# Patient Record
Sex: FEMALE | Race: WHITE | URBAN - NONMETROPOLITAN AREA
[De-identification: names, ages, dates, MRNs, and addresses within clinical notes are randomized per-mention and may not be internally consistent; named-entity substitution may affect disease eponyms.]

---

## 2022-06-08 ENCOUNTER — TELEPHONE (OUTPATIENT)
Dept: OBGYN CLINIC | Age: 42
End: 2022-06-08

## 2022-06-08 NOTE — TELEPHONE ENCOUNTER
Patient called stating that she has a visit tomorrow for a UTI, but questioning what she can use in the meantime. Her symptoms started last Wednesday and she was treating with herbal medication since then. She states that symptoms started to resolve, but was out of town for the weekend and symptoms have worsened. She is experiencing dysuria and frequency. She did have a low grade fever last night, but nothing this morning. She has also noticed some back pain, but thinks it may be related to the hotel bed. She is aware that she can try OTC Azo to see if that helps with the discomfort before her appointment tomorrow. She can use Tylenol/motrin if she notices a fever. If symptoms worsen, she is aware that we have openings this afternoon and can move her visit up. Patient verbalized understanding, no further questions/concerns voiced.

## 2022-06-09 ENCOUNTER — OFFICE VISIT (OUTPATIENT)
Dept: OBGYN CLINIC | Age: 42
End: 2022-06-09
Payer: COMMERCIAL

## 2022-06-09 ENCOUNTER — HOSPITAL ENCOUNTER (OUTPATIENT)
Age: 42
Setting detail: SPECIMEN
Discharge: HOME | End: 2022-06-09

## 2022-06-09 VITALS
WEIGHT: 187 LBS | DIASTOLIC BLOOD PRESSURE: 72 MMHG | BODY MASS INDEX: 30.05 KG/M2 | HEIGHT: 66 IN | SYSTOLIC BLOOD PRESSURE: 122 MMHG

## 2022-06-09 DIAGNOSIS — R30.0 DYSURIA: ICD-10-CM

## 2022-06-09 DIAGNOSIS — Z01.419 VISIT FOR GYNECOLOGIC EXAMINATION: Primary | ICD-10-CM

## 2022-06-09 DIAGNOSIS — Z12.31 ENCOUNTER FOR SCREENING MAMMOGRAM FOR MALIGNANT NEOPLASM OF BREAST: ICD-10-CM

## 2022-06-09 LAB
BILIRUBIN, POC: ABNORMAL
BLOOD URINE, POC: ABNORMAL
CLARITY, POC: CLEAR
COLOR, POC: ABNORMAL
GLUCOSE URINE, POC: ABNORMAL
KETONES, POC: ABNORMAL
LEUKOCYTE EST, POC: ABNORMAL
NITRITE, POC: ABNORMAL
PH, POC: 6
PROTEIN, POC: ABNORMAL
SPECIFIC GRAVITY, POC: 1.05
UROBILINOGEN, POC: ABNORMAL

## 2022-06-09 PROCEDURE — 81002 URINALYSIS NONAUTO W/O SCOPE: CPT

## 2022-06-09 PROCEDURE — 99386 PREV VISIT NEW AGE 40-64: CPT

## 2022-06-09 RX ORDER — NITROFURANTOIN 25; 75 MG/1; MG/1
100 CAPSULE ORAL 2 TIMES DAILY
Qty: 10 CAPSULE | Refills: 0 | Status: SHIPPED | OUTPATIENT
Start: 2022-06-09 | End: 2022-06-14

## 2022-06-10 LAB
HPV SAMPLE: NORMAL
HPV, GENOTYPE 16: NOT DETECTED
HPV, GENOTYPE 18: NOT DETECTED
HPV, HIGH RISK OTHER: NOT DETECTED
HPV, INTERPRETATION: NORMAL
SPECIMEN DESCRIPTION: NORMAL

## 2022-06-11 LAB
CULTURE: ABNORMAL
SPECIMEN DESCRIPTION: ABNORMAL

## 2022-06-15 LAB — CYTOLOGY REPORT: NORMAL

## 2022-06-20 ASSESSMENT — ENCOUNTER SYMPTOMS
SHORTNESS OF BREATH: 0
CONSTIPATION: 0
ABDOMINAL PAIN: 0
DIARRHEA: 0

## 2022-06-20 NOTE — PROGRESS NOTES
YEARLY PHYSICAL    Date of service: 2022    Vandana Bo  Is a 40 y.o. female    PT's PCP is: No primary care provider on file. : 1980                                         Chaperone for Intimate Exam   Chaperone was offered as part of the rooming process. Patient declined and agrees to continue with exam without a chaperone. Subjective:       Patient's last menstrual period was 2022 (approximate). Are your menses regular: yes    OB History    Para Term  AB Living   3 2 2   1 1   SAB IAB Ectopic Molar Multiple Live Births   1         1      # Outcome Date GA Lbr Nam/2nd Weight Sex Delivery Anes PTL Lv   3 Term 18 39w0d    Vag-Forceps EPI     2 SAB 2018     SAB      1 Term 12/28/15 41w0d    Vag-Vacuum EPI  MARCIE        Social History     Tobacco Use   Smoking Status Not on file   Smokeless Tobacco Never Used        Social History     Substance and Sexual Activity   Alcohol Use None    Comment: rare/light       Family History   Problem Relation Age of Onset    Rheum Arthritis Paternal Grandfather     Ovarian Cancer Paternal Grandmother     Depression Maternal Grandmother     No Known Problems Maternal Grandfather     Hypertension Father     High Cholesterol Mother        Any family history of breast or ovarian cancer: Yes    Any family history of blood clots: No      Allergies: Patient has no known allergies. No current outpatient medications on file. Social History     Substance and Sexual Activity   Sexual Activity Yes       Any bleeding or pain with intercourse: No    Last Yearly:  2019    Last pap: 2019    Last HPV: 2019    Last Mammogram: n/a    Last Dexascan n/a    Last colorectal screen- type: n/a    Do you do self breast exams: encouraged SBE    History reviewed. No pertinent past medical history. No past surgical history on file.     Family History   Problem Relation Age of Onset    Rheum Arthritis Paternal Grandfather     Ovarian Cancer Paternal Grandmother     Depression Maternal Grandmother     No Known Problems Maternal Grandfather     Hypertension Father     High Cholesterol Mother        Chief Complaint   Patient presents with    Annual Exam     Patient not seen since 2019. Due for pap. Also having UTI symptoms sinc last Wed. Has not had mamm yet. PE:  Vital Signs  Blood pressure 122/72, height 5' 6\" (1.676 m), weight 187 lb (84.8 kg), last menstrual period 05/18/2022. Estimated body mass index is 30.18 kg/m² as calculated from the following:    Height as of this encounter: 5' 6\" (1.676 m). Weight as of this encounter: 187 lb (84.8 kg). Labs:    Results for orders placed or performed in visit on 06/09/22   POCT Urinalysis Dipstick (No Micro)   Result Value Ref Range    Color, UA straw     Clarity, UA clear     Glucose, UA POC neg     Bilirubin, UA neg     Ketones, UA neg     Spec Grav, UA 1.050     Blood, UA POC 2+     pH, UA 6     Protein, UA POC neg     Urobilinogen, UA neg     Leukocytes, UA 2+     Nitrite, UA neg        No data recorded    NURSE: Marci Barba MA    HPI: Patient presents to [facility] care for annual visit. Denies breast concerns. Has not had mammogram yet - agreeable to scheduling. Denies bowel concerns. Has been having dysuria and feeling feverish since Wednesday. UA in clinic shows 2+ blood and 2+ leuks - will send ABX as pt is symptomatic and await culture results to ensure proper ABX therapy. Denies abnormal discharge, pain with intercourse. Review of Systems   Constitutional: Negative for chills, fatigue and fever (has been feeling feverish \"low-grade\"). Respiratory: Negative for shortness of breath. Cardiovascular: Negative for chest pain. Gastrointestinal: Negative for abdominal pain, constipation and diarrhea. Genitourinary: Positive for dysuria and frequency.  Negative for dyspareunia, menstrual problem, pelvic pain, urgency, vaginal bleeding and vaginal discharge. Neurological: Negative for dizziness, light-headedness and headaches. Physical Exam  Constitutional:       Appearance: Normal appearance. Genitourinary:      Vulva normal.      Right Labia: No rash, tenderness or lesions. Left Labia: No tenderness, lesions or rash. No vaginal discharge, tenderness or bleeding. Right Adnexa: not tender and not full. Left Adnexa: not tender and not full. No cervical motion tenderness or discharge. Uterus is not enlarged or tender. Pelvic exam was performed with patient in the lithotomy position. Breasts: Breasts are symmetrical.      Right: No inverted nipple, nipple discharge, skin change or tenderness. Left: No inverted nipple, nipple discharge, skin change or tenderness. HENT:      Head: Normocephalic and atraumatic. Mouth/Throat:      Mouth: Mucous membranes are moist.   Eyes:      Extraocular Movements: Extraocular movements intact. Cardiovascular:      Rate and Rhythm: Normal rate. Pulmonary:      Effort: Pulmonary effort is normal.   Abdominal:      General: Abdomen is flat. There is no distension. Palpations: Abdomen is soft. Tenderness: There is no abdominal tenderness. Musculoskeletal:         General: Normal range of motion. Cervical back: Normal range of motion. Neurological:      General: No focal deficit present. Mental Status: She is alert and oriented to person, place, and time. Skin:     General: Skin is warm and dry. Psychiatric:         Mood and Affect: Mood normal.         Behavior: Behavior normal.         Thought Content: Thought content normal.         Judgment: Judgment normal.   Chaperone present: chaperone declined. Assessment and Plan          Diagnosis Orders   1. Visit for gynecologic examination  PAP SMEAR   2.  Dysuria  POCT Urinalysis Dipstick (No Micro)    Culture, Urine   3. Encounter for screening mammogram for malignant neoplasm of breast  DOMENICO DIGITAL SCREEN W OR WO CAD BILATERAL       Repeat Annual every 1 year  Cervical Cytology Evaluation begins at 24years old. If Negative Cytology, Follow-up screening per current guidelines. Mammograms every 1year. If 37 yo and last mammogram was negative. Routine healthmaintenance per patients PCP. I am having Na Real start on nitrofurantoin (macrocrystal-monohydrate). Return in about 1 year (around 6/9/2023) for annual.    She was also counseled on her preventative health maintenance recommendations and follow-up. There are no Patient Instructions on file for this visit.     Ramon Mckinley PA-C,6/20/2022 7:32 AM

## 2024-07-11 ENCOUNTER — OFFICE VISIT (OUTPATIENT)
Dept: OBGYN CLINIC | Age: 44
End: 2024-07-11

## 2024-07-11 ENCOUNTER — HOSPITAL ENCOUNTER (OUTPATIENT)
Age: 44
Setting detail: SPECIMEN
Discharge: HOME | End: 2024-07-11

## 2024-07-11 VITALS
HEIGHT: 66 IN | BODY MASS INDEX: 28.12 KG/M2 | DIASTOLIC BLOOD PRESSURE: 80 MMHG | WEIGHT: 175 LBS | SYSTOLIC BLOOD PRESSURE: 118 MMHG

## 2024-07-11 DIAGNOSIS — R35.0 FREQUENCY OF MICTURITION: ICD-10-CM

## 2024-07-11 DIAGNOSIS — R30.0 DYSURIA: ICD-10-CM

## 2024-07-11 DIAGNOSIS — R30.0 DYSURIA: Primary | ICD-10-CM

## 2024-07-11 LAB
APPEARANCE FLUID: ABNORMAL
BILIRUBIN, POC: NEGATIVE
BLOOD URINE, POC: ABNORMAL
CLARITY, POC: ABNORMAL
COLOR, POC: ABNORMAL
GLUCOSE URINE, POC: NEGATIVE
KETONES, POC: NEGATIVE
LEUKOCYTE EST, POC: ABNORMAL
NITRITE, POC: POSITIVE
PH, POC: 6.5
PROTEIN, POC: ABNORMAL
SPECIFIC GRAVITY, POC: 1.01
UROBILINOGEN, POC: ABNORMAL

## 2024-07-11 RX ORDER — FLUCONAZOLE 150 MG/1
150 TABLET ORAL ONCE
Qty: 1 TABLET | Refills: 0 | Status: SHIPPED | OUTPATIENT
Start: 2024-07-11 | End: 2024-07-11

## 2024-07-11 RX ORDER — SULFAMETHOXAZOLE AND TRIMETHOPRIM 800; 160 MG/1; MG/1
1 TABLET ORAL 2 TIMES DAILY
Qty: 14 TABLET | Refills: 0 | Status: SHIPPED | OUTPATIENT
Start: 2024-07-11 | End: 2024-07-18

## 2024-07-11 SDOH — ECONOMIC STABILITY: INCOME INSECURITY: HOW HARD IS IT FOR YOU TO PAY FOR THE VERY BASICS LIKE FOOD, HOUSING, MEDICAL CARE, AND HEATING?: NOT HARD AT ALL

## 2024-07-11 SDOH — ECONOMIC STABILITY: HOUSING INSECURITY
IN THE LAST 12 MONTHS, WAS THERE A TIME WHEN YOU DID NOT HAVE A STEADY PLACE TO SLEEP OR SLEPT IN A SHELTER (INCLUDING NOW)?: NO

## 2024-07-11 SDOH — ECONOMIC STABILITY: FOOD INSECURITY: WITHIN THE PAST 12 MONTHS, THE FOOD YOU BOUGHT JUST DIDN'T LAST AND YOU DIDN'T HAVE MONEY TO GET MORE.: NEVER TRUE

## 2024-07-11 SDOH — ECONOMIC STABILITY: FOOD INSECURITY: WITHIN THE PAST 12 MONTHS, YOU WORRIED THAT YOUR FOOD WOULD RUN OUT BEFORE YOU GOT MONEY TO BUY MORE.: NEVER TRUE

## 2024-07-11 SDOH — ECONOMIC STABILITY: TRANSPORTATION INSECURITY
IN THE PAST 12 MONTHS, HAS LACK OF TRANSPORTATION KEPT YOU FROM MEETINGS, WORK, OR FROM GETTING THINGS NEEDED FOR DAILY LIVING?: NO

## 2024-07-11 ASSESSMENT — PATIENT HEALTH QUESTIONNAIRE - PHQ9
SUM OF ALL RESPONSES TO PHQ QUESTIONS 1-9: 0
SUM OF ALL RESPONSES TO PHQ9 QUESTIONS 1 & 2: 0
1. LITTLE INTEREST OR PLEASURE IN DOING THINGS: NOT AT ALL
SUM OF ALL RESPONSES TO PHQ QUESTIONS 1-9: 0
2. FEELING DOWN, DEPRESSED OR HOPELESS: NOT AT ALL
2. FEELING DOWN, DEPRESSED OR HOPELESS: NOT AT ALL
SUM OF ALL RESPONSES TO PHQ9 QUESTIONS 1 & 2: 0
1. LITTLE INTEREST OR PLEASURE IN DOING THINGS: NOT AT ALL

## 2024-07-11 ASSESSMENT — ENCOUNTER SYMPTOMS
GASTROINTESTINAL NEGATIVE: 1
RESPIRATORY NEGATIVE: 1

## 2024-07-11 NOTE — PROGRESS NOTES
PROBLEM VISIT     Date of service: 2024    Na DanielAshlynHogan  Is a 44 y.o.  female    PT's PCP is: No primary care provider on file.     : 1980                                          HPI Menses over holiday weekend and then urinary s/s started.  Last evening had cloudy urine and \"never do since drink so much water\".  Having low back discomfort and dysuria.  Last azo was yesterday.  Denies known fever.     Review of Systems   Constitutional: Negative.    HENT: Negative.     Respiratory: Negative.     Gastrointestinal: Negative.    Genitourinary:  Positive for dysuria, frequency and urgency. Negative for vaginal bleeding, vaginal discharge and vaginal pain.   Neurological: Negative.    Psychiatric/Behavioral: Negative.         Patient's last menstrual period was 2024 (exact date).   OB History    Para Term  AB Living   3 2 2   1 1   SAB IAB Ectopic Molar Multiple Live Births   1         1      # Outcome Date GA Lbr Nam/2nd Weight Sex Delivery Anes PTL Lv   3 Term 18 39w0d    Vag-Forceps EPI     2 SAB 2018     SAB      1 Term 12/28/15 41w0d    Vag-Vacuum EPI  MARCIE        Social History     Tobacco Use   Smoking Status Never   Smokeless Tobacco Never        Social History     Substance and Sexual Activity   Alcohol Use Never    Comment: rare/light       Allergies: Patient has no known allergies.      Current Outpatient Medications:     sulfamethoxazole-trimethoprim (BACTRIM DS;SEPTRA DS) 800-160 MG per tablet, Take 1 tablet by mouth 2 times daily for 7 days, Disp: 14 tablet, Rfl: 0    fluconazole (DIFLUCAN) 150 MG tablet, Take 1 tablet by mouth once for 1 dose, Disp: 1 tablet, Rfl: 0    Social History     Substance and Sexual Activity   Sexual Activity Yes    Partners: Male       Chief Complaint   Patient presents with    Urinary Frequency     C/o pressure, pain when urinating and frequency. Pt took Azo last night.        Physical Exam  Constitutional:

## 2024-07-13 LAB
MICROORGANISM SPEC CULT: ABNORMAL
SERVICE CMNT-IMP: ABNORMAL
SPECIMEN DESCRIPTION: ABNORMAL

## 2024-09-10 ENCOUNTER — OFFICE VISIT (OUTPATIENT)
Dept: OBGYN CLINIC | Age: 44
End: 2024-09-10
Payer: COMMERCIAL

## 2024-09-10 VITALS
BODY MASS INDEX: 25.71 KG/M2 | SYSTOLIC BLOOD PRESSURE: 100 MMHG | WEIGHT: 160 LBS | HEIGHT: 66 IN | DIASTOLIC BLOOD PRESSURE: 70 MMHG

## 2024-09-10 DIAGNOSIS — Z12.31 ENCOUNTER FOR SCREENING MAMMOGRAM FOR MALIGNANT NEOPLASM OF BREAST: ICD-10-CM

## 2024-09-10 DIAGNOSIS — Z12.72 SMEAR, VAGINAL, AS PART OF ROUTINE GYNECOLOGICAL EXAMINATION: Primary | ICD-10-CM

## 2024-09-10 DIAGNOSIS — Z01.419 SMEAR, VAGINAL, AS PART OF ROUTINE GYNECOLOGICAL EXAMINATION: Primary | ICD-10-CM

## 2024-09-10 PROCEDURE — 99396 PREV VISIT EST AGE 40-64: CPT | Performed by: ADVANCED PRACTICE MIDWIFE

## 2024-09-10 ASSESSMENT — ENCOUNTER SYMPTOMS
ABDOMINAL PAIN: 0
SHORTNESS OF BREATH: 0
DIARRHEA: 0
CONSTIPATION: 0
RESPIRATORY NEGATIVE: 1
GASTROINTESTINAL NEGATIVE: 1

## 2024-10-24 ENCOUNTER — TELEPHONE (OUTPATIENT)
Dept: OBGYN CLINIC | Age: 44
End: 2024-10-24

## 2024-10-24 NOTE — TELEPHONE ENCOUNTER
Please call patient with mammogram results:    Negative for malignancy and repeat screening in 1 year  However her lifetime risk of developing breast cancer is 22.4% therefore she is a candidate for a screening breast MRI.    If she would like MRI - we suggest doing this in 6 months and will need to check insurance coverage.  Thanks

## 2024-10-28 DIAGNOSIS — Z12.31 ENCOUNTER FOR SCREENING MAMMOGRAM FOR MALIGNANT NEOPLASM OF BREAST: ICD-10-CM

## 2024-10-28 NOTE — TELEPHONE ENCOUNTER
Patient called back and was informed of results/recommendations. Advised her that if interested in breast MRI to check with insurance and see if they cover.  If she desires an order she can let us know.  Should do in 6 months and alternate with mamms so receiving screening every 6 months.